# Patient Record
Sex: MALE | Race: BLACK OR AFRICAN AMERICAN
[De-identification: names, ages, dates, MRNs, and addresses within clinical notes are randomized per-mention and may not be internally consistent; named-entity substitution may affect disease eponyms.]

---

## 2020-06-29 ENCOUNTER — HOSPITAL ENCOUNTER (INPATIENT)
Dept: HOSPITAL 92 - ERS | Age: 40
LOS: 3 days | Discharge: HOME | DRG: 638 | End: 2020-07-02
Attending: INTERNAL MEDICINE | Admitting: INTERNAL MEDICINE
Payer: MEDICAID

## 2020-06-29 VITALS — BODY MASS INDEX: 31.8 KG/M2

## 2020-06-29 DIAGNOSIS — E66.9: ICD-10-CM

## 2020-06-29 DIAGNOSIS — E11.10: Primary | ICD-10-CM

## 2020-06-29 DIAGNOSIS — N17.9: ICD-10-CM

## 2020-06-29 DIAGNOSIS — Z87.891: ICD-10-CM

## 2020-06-29 DIAGNOSIS — Z88.0: ICD-10-CM

## 2020-06-29 DIAGNOSIS — Z79.4: ICD-10-CM

## 2020-06-29 LAB
ALBUMIN SERPL BCG-MCNC: 4.3 G/DL (ref 3.5–5)
ALP SERPL-CCNC: 130 U/L (ref 40–110)
ALT SERPL W P-5'-P-CCNC: 28 U/L (ref 8–55)
ANION GAP SERPL CALC-SCNC: (no result) MMOL/L (ref 10–20)
ANION GAP SERPL CALC-SCNC: (no result) MMOL/L (ref 10–20)
APAP SERPL-MCNC: (no result) MCG/ML (ref 10–30)
AST SERPL-CCNC: 13 U/L (ref 5–34)
BACTERIA UR QL AUTO: (no result) HPF
BASOPHILS # BLD AUTO: 0 THOU/UL (ref 0–0.2)
BASOPHILS NFR BLD AUTO: 0.3 % (ref 0–1)
BILIRUB SERPL-MCNC: 0.3 MG/DL (ref 0.2–1.2)
BUN SERPL-MCNC: 34 MG/DL (ref 8.9–20.6)
BUN SERPL-MCNC: 37 MG/DL (ref 8.9–20.6)
CALCIUM SERPL-MCNC: 8.7 MG/DL (ref 7.8–10.44)
CALCIUM SERPL-MCNC: 8.8 MG/DL (ref 7.8–10.44)
CHLORIDE SERPL-SCNC: 103 MMOL/L (ref 98–107)
CHLORIDE SERPL-SCNC: 93 MMOL/L (ref 98–107)
CO2 SERPL-SCNC: (no result) MMOL/L (ref 22–29)
CO2 SERPL-SCNC: (no result) MMOL/L (ref 22–29)
CREAT CL PREDICTED SERPL C-G-VRATE: 0 ML/MIN (ref 70–130)
CREAT CL PREDICTED SERPL C-G-VRATE: 0 ML/MIN (ref 70–130)
EOSINOPHIL # BLD AUTO: 0 THOU/UL (ref 0–0.7)
EOSINOPHIL NFR BLD AUTO: 0.2 % (ref 0–10)
GLOBULIN SER CALC-MCNC: 3.7 G/DL (ref 2.4–3.5)
GLUCOSE SERPL-MCNC: 608 MG/DL (ref 70–105)
GLUCOSE SERPL-MCNC: 871 MG/DL (ref 70–105)
HGB BLD-MCNC: 14.5 G/DL (ref 14–18)
LIPASE SERPL-CCNC: 14 U/L (ref 8–78)
LYMPHOCYTES # BLD: 1.5 THOU/UL (ref 1.2–3.4)
LYMPHOCYTES NFR BLD AUTO: 13.5 % (ref 21–51)
MCH RBC QN AUTO: 26.5 PG (ref 27–31)
MCV RBC AUTO: 88.5 FL (ref 78–98)
MONOCYTES # BLD AUTO: 0.8 THOU/UL (ref 0.11–0.59)
MONOCYTES NFR BLD AUTO: 7.1 % (ref 0–10)
NEUTROPHILS # BLD AUTO: 8.7 THOU/UL (ref 1.4–6.5)
NEUTROPHILS NFR BLD AUTO: 79 % (ref 42–75)
PLATELET # BLD AUTO: 240 THOU/UL (ref 130–400)
POTASSIUM SERPL-SCNC: 5.1 MMOL/L (ref 3.5–5.1)
POTASSIUM SERPL-SCNC: 5.6 MMOL/L (ref 3.5–5.1)
PROT UR STRIP.AUTO-MCNC: 20 MG/DL
RBC # BLD AUTO: 5.5 MILL/UL (ref 4.7–6.1)
RBC UR QL AUTO: (no result) HPF (ref 0–3)
SALICYLATES SERPL-MCNC: (no result) MG/DL (ref 15–30)
SODIUM SERPL-SCNC: 129 MMOL/L (ref 136–145)
SODIUM SERPL-SCNC: 136 MMOL/L (ref 136–145)
WBC # BLD AUTO: 11 THOU/UL (ref 4.8–10.8)
WBC UR QL AUTO: (no result) HPF (ref 0–3)

## 2020-06-29 PROCEDURE — 96366 THER/PROPH/DIAG IV INF ADDON: CPT

## 2020-06-29 PROCEDURE — 83690 ASSAY OF LIPASE: CPT

## 2020-06-29 PROCEDURE — 83880 ASSAY OF NATRIURETIC PEPTIDE: CPT

## 2020-06-29 PROCEDURE — 71045 X-RAY EXAM CHEST 1 VIEW: CPT

## 2020-06-29 PROCEDURE — 82330 ASSAY OF CALCIUM: CPT

## 2020-06-29 PROCEDURE — 85014 HEMATOCRIT: CPT

## 2020-06-29 PROCEDURE — 74177 CT ABD & PELVIS W/CONTRAST: CPT

## 2020-06-29 PROCEDURE — 83605 ASSAY OF LACTIC ACID: CPT

## 2020-06-29 PROCEDURE — 36416 COLLJ CAPILLARY BLOOD SPEC: CPT

## 2020-06-29 PROCEDURE — 84484 ASSAY OF TROPONIN QUANT: CPT

## 2020-06-29 PROCEDURE — 36415 COLL VENOUS BLD VENIPUNCTURE: CPT

## 2020-06-29 PROCEDURE — 82803 BLOOD GASES ANY COMBINATION: CPT

## 2020-06-29 PROCEDURE — 96365 THER/PROPH/DIAG IV INF INIT: CPT

## 2020-06-29 PROCEDURE — 81015 MICROSCOPIC EXAM OF URINE: CPT

## 2020-06-29 PROCEDURE — 85025 COMPLETE CBC W/AUTO DIFF WBC: CPT

## 2020-06-29 PROCEDURE — 80307 DRUG TEST PRSMV CHEM ANLYZR: CPT

## 2020-06-29 PROCEDURE — 80048 BASIC METABOLIC PNL TOTAL CA: CPT

## 2020-06-29 PROCEDURE — 96361 HYDRATE IV INFUSION ADD-ON: CPT

## 2020-06-29 PROCEDURE — 80053 COMPREHEN METABOLIC PANEL: CPT

## 2020-06-29 PROCEDURE — 87040 BLOOD CULTURE FOR BACTERIA: CPT

## 2020-06-29 PROCEDURE — 81003 URINALYSIS AUTO W/O SCOPE: CPT

## 2020-06-29 PROCEDURE — 96375 TX/PRO/DX INJ NEW DRUG ADDON: CPT

## 2020-06-29 PROCEDURE — 93005 ELECTROCARDIOGRAM TRACING: CPT

## 2020-06-29 NOTE — PDOC.HHP
Hospitalist HPI





- History of Present Illness


abdominal pain


History of Present Illness: 


Case of an 41y/o male with pmhx of DMt2 who comes to hospital due to abdominal 

pain. patient refers he was on his usual state of health until about a week ago 

when he noticed he started with polyuria and polydyspsia. symptoms kept 

progressing and 2 days ago patient started with abdominal pain nause and 

vomiting. symptoms continued to progressed until pain was unberable 10/10 in 

epigastric area, non radiating and patient decided to come to hospital. At the 

ED patient was diagnosed with DKA with blood glucouse over 800, elevated anion 

gap acidosis and positive ketones in urine for which hospitalist was consulted 

for further evaluation and management.


patient denies any fever chills dysuria diarrhe or cough, states has been 

compliant with medication despite not eating in the last 2 days due to abd 

discomfort. does refers this has happened in the past 





Hospitalist ROS





- Review of Systems


All other systems reviewed; all pertinent +/- noted in HPI/Subj





Hospitalist History





- Past Medical History


Source: patient


Endocrine: reports: Diabetes





- Past Surgical History


Past Surgical History: reports: no pertinent history





- Family History


Family History: reports: diabetes mellitus





- Social History


Smoking Status: Former smoker


Alcohol: reports: None


Drugs: reports: none


Activity level: independent ambulation





- Exam


General Appearance: NAD, awake alert


Eye: PERRL, anicteric sclera


ENT: normocephalic atraumatic, no oropharyngeal lesions, dry oral mucosa


Neck: supple, symmetric, no JVD, no thyromegaly


Heart: no murmur, no gallops, no rubs, normal peripheral pulses


Heart - other findings: tachycardia


Respiratory: CTAB, no wheezes, no rales


Gastrointestinal: soft, non-distended, normal bowel sounds, no palpable masses


Extremities: no cyanosis, no clubbing, no edema


Skin: normal turgor, no lesions, no rashes


Neurological: cranial nerve grossly intact, normal sensation to touch, no 

weakness


Musculoskeletal: normal tone, normal strength, no muscle wasting


Psychiatric: normal affect, normal behavior, A&O x 3





Hospitalist Results





- Labs


Result Diagrams: 


 06/29/20 19:12





 06/29/20 19:12


Lab results: 


 











WBC  11.0 thou/uL (4.8-10.8)  H  06/29/20  19:12    


 


Hgb  14.5 g/dL (14.0-18.0)   06/29/20  19:12    


 


Hct  48.6 % (42.0-52.0)   06/29/20  19:12    


 


MCV  88.5 fL (78.0-98.0)   06/29/20  19:12    


 


Plt Count  240 thou/uL (130-400)   06/29/20  19:12    


 


Neutrophils %  79.0 % (42.0-75.0)  H  06/29/20  19:12    


 


Sodium  129 mmol/L (136-145)  L  06/29/20  19:12    


 


Potassium  5.6 mmol/L (3.5-5.1)  H  06/29/20  19:12    


 


Chloride  93 mmol/L ()  L  06/29/20  19:12    


 


Carbon Dioxide  Less than  8 mmol/L (22-29)  L*  06/29/20  19:12    


 


BUN  37 mg/dL (8.9-20.6)  H  06/29/20  19:12    


 


Creatinine  2.62 mg/dL (0.7-1.3)  H  06/29/20  19:12    


 


Glucose  871 mg/dL ()  H*  06/29/20  19:12    


 


Calcium  8.8 mg/dL (7.8-10.44)   06/29/20  19:12    


 


Total Bilirubin  0.3 mg/dL (0.2-1.2)   06/29/20  19:12    


 


AST  13 U/L (5-34)   06/29/20  19:12    


 


ALT  28 U/L (8-55)   06/29/20  19:12    


 


Alkaline Phosphatase  130 U/L ()  H  06/29/20  19:12    


 


Troponin I  Less than  0.010 ng/mL (< 0.028)   06/29/20  19:12    


 


B-Natriuretic Peptide  Less than  10.0 pg/mL (0-100)   06/29/20  19:12    


 


Serum Total Protein  8.0 g/dL (6.0-8.3)   06/29/20  19:12    


 


Albumin  4.3 g/dL (3.5-5.0)   06/29/20  19:12    


 


Lipase  14 U/L (8-78)   06/29/20  19:12    


 


Urine Ketones  100 mg/dL (Negative)  A  06/29/20  20:56    


 


Urine Blood  1+  (Negative)  A  06/29/20  20:56    


 


Urine Nitrite  Negative  (Negative)   06/29/20  20:56    


 


Ur Leukocyte Esterase  Negative Delio/uL (Negative)   06/29/20  20:56    


 


Urine RBC  0-3 HPF (0-3)   06/29/20  20:56    


 


Urine WBC  0-3 HPF (0-3)   06/29/20  20:56    


 


Ur Squamous Epith Cells  0-3 HPF (0-3)   06/29/20  20:56    


 


Urine Bacteria  Rare-Few HPF (None Seen)   06/29/20  20:56    














- Radiology Interpretation


  ** CT scan - abdomen


Additional Comment: 





distended bladder





  ** Chest x-ray


Additional Comment: 





no consolidation effusions or infiltrates





Hospitalist H&P A/P





- Problem


(1) DKA (diabetic ketoacidoses)


Code(s): E11.10 - TYPE 2 DIABETES MELLITUS WITH KETOACIDOSIS WITHOUT COMA   

Status: Acute   





(2) Metabolic acidosis due to diabetes mellitus


Code(s): E11.69 - TYPE 2 DIABETES MELLITUS WITH OTHER SPECIFIED COMPLICATION; 

E87.2 - ACIDOSIS   Status: Acute   





(3) Nausea & vomiting


Code(s): R11.2 - NAUSEA WITH VOMITING, UNSPECIFIED   Status: Acute   





(4) Intractable abdominal pain


Code(s): R10.9 - UNSPECIFIED ABDOMINAL PAIN   Status: Acute   





(5) Obese


Code(s): E66.9 - OBESITY, UNSPECIFIED   Status: Acute   





- Plan


Plan: 


39 y/o male type 2 DM who presents with DKA





- insulin drip


-aggressive  hydration


- bmp q 4 hrs, monitor K and anion gap


- accu check q 1 hr


- npo


-symptomatic tx for n/v


- pain management


- dvt prophylaxis

## 2020-06-29 NOTE — RAD
XR Chest 1 View Portable



History: Chest pain



Comparison: None



Findings: Lungs are clear. No pneumothorax or effusion. Cardiac silhouette and mediastinal contours a
re within normal limits. No acute osseous abnormality.



Impression: No acute intrathoracic abnormality.



Reported By: Jeramie Marcial 

Electronically Signed:  6/29/2020 7:44 PM

## 2020-06-29 NOTE — CT
CT Abdomen Pelvis W Con



History: Abdominal pain



Comparison: None.



Findings: Exam is limited due to motion artifact. The lung bases are clear. No pericardial effusion.



Marked distention of the urinary bladder. Mild bilateral hydroureter and dilated renal pelvis by with
out distal obstructing stone.



The liver, spleen, pancreas, adrenal glands are unremarkable. The aortoiliac contour is nonaneurysmal
. The appendix is visualized and is normal.



Few mildly enlarged ileocolic mesenteric lymph nodes.



No acute osseous abnormality.



Impression: 

1. Marked distention of the urinary bladder with subsequent mild dilatation of both renal pelvis and 
ureters due to increased back pressure. Evaluation for neurogenic bladder recommended. Posada

catheterization suggested.

2. No other acute inflammatory process within the abdomen or pelvis. Normal appendix.



Reported By: Jeramie Marcial 

Electronically Signed:  6/29/2020 8:50 PM

## 2020-06-30 LAB
ANION GAP SERPL CALC-SCNC: 10 MMOL/L (ref 10–20)
ANION GAP SERPL CALC-SCNC: 13 MMOL/L (ref 10–20)
ANION GAP SERPL CALC-SCNC: 20 MMOL/L (ref 10–20)
ANION GAP SERPL CALC-SCNC: 23 MMOL/L (ref 10–20)
ANION GAP SERPL CALC-SCNC: 25 MMOL/L (ref 10–20)
BICARBONATE (HCO3V): 6.5 MMOL/L (ref 22–28)
BUN SERPL-MCNC: 15 MG/DL (ref 8.9–20.6)
BUN SERPL-MCNC: 18 MG/DL (ref 8.9–20.6)
BUN SERPL-MCNC: 25 MG/DL (ref 8.9–20.6)
BUN SERPL-MCNC: 27 MG/DL (ref 8.9–20.6)
BUN SERPL-MCNC: 29 MG/DL (ref 8.9–20.6)
CA-I BLD-SCNC: 1.04 MMOL/L
CALCIUM SERPL-MCNC: 7.7 MG/DL (ref 7.8–10.44)
CALCIUM SERPL-MCNC: 7.9 MG/DL (ref 7.8–10.44)
CALCIUM SERPL-MCNC: 8 MG/DL (ref 7.8–10.44)
CALCIUM SERPL-MCNC: 8.1 MG/DL (ref 7.8–10.44)
CALCIUM SERPL-MCNC: 8.1 MG/DL (ref 7.8–10.44)
CHLORIDE SERPL-SCNC: 101 MMOL/L (ref 98–107)
CHLORIDE SERPL-SCNC: 106 MMOL/L (ref 98–107)
CHLORIDE SERPL-SCNC: 106 MMOL/L (ref 98–107)
CHLORIDE SERPL-SCNC: 107 MMOL/L (ref 98–107)
CHLORIDE SERPL-SCNC: 108 MMOL/L (ref 98–107)
CHLORIDE SERPL-SCNC: 109 MMOL/L (ref 98–107)
CO2 BLDV CALC-SCNC: 7.3 MMOL/L (ref 22–28)
CO2 SERPL-SCNC: 10 MMOL/L (ref 22–29)
CO2 SERPL-SCNC: 12 MMOL/L (ref 22–29)
CO2 SERPL-SCNC: 14 MMOL/L (ref 22–29)
CO2 SERPL-SCNC: 18 MMOL/L (ref 22–29)
CO2 SERPL-SCNC: 21 MMOL/L (ref 22–29)
CO2 TENSION (PVCO2): 24.3 MMHG (ref 40–50)
CREAT CL PREDICTED SERPL C-G-VRATE: 101 ML/MIN (ref 70–130)
CREAT CL PREDICTED SERPL C-G-VRATE: 73 ML/MIN (ref 70–130)
CREAT CL PREDICTED SERPL C-G-VRATE: 80 ML/MIN (ref 70–130)
CREAT CL PREDICTED SERPL C-G-VRATE: 84 ML/MIN (ref 70–130)
CREAT CL PREDICTED SERPL C-G-VRATE: 97 ML/MIN (ref 70–130)
GLUCOSE SERPL-MCNC: (no result) MG/DL (ref 70–105)
GLUCOSE SERPL-MCNC: 171 MG/DL (ref 70–105)
GLUCOSE SERPL-MCNC: 182 MG/DL (ref 70–105)
GLUCOSE SERPL-MCNC: 266 MG/DL (ref 70–105)
GLUCOSE SERPL-MCNC: 289 MG/DL (ref 70–105)
GLUCOSE SERPL-MCNC: 335 MG/DL (ref 70–105)
HCT VFR BLD CALC: 51 % (ref 42–52)
HEMOGLOBIN - CALC: 17.4 G/DL (ref 14–18)
POTASSIUM SERPL-SCNC: 3.8 MMOL/L (ref 3.5–5.1)
POTASSIUM SERPL-SCNC: 4.2 MMOL/L (ref 3.5–5.1)
POTASSIUM SERPL-SCNC: 4.4 MMOL/L (ref 3.5–5.1)
POTASSIUM SERPL-SCNC: 4.6 MMOL/L (ref 3.5–5.1)
POTASSIUM SERPL-SCNC: 4.9 MMOL/L (ref 3.5–5.1)
POTASSIUM SERPL-SCNC: 5.4 MMOL/L (ref 3.5–5.1)
SAO2 % BLDV FROM PO2: 74.7 % (ref 60–85)
SODIUM SERPL-SCNC: 126 MMOL/L (ref 138–145)
SODIUM SERPL-SCNC: 135 MMOL/L (ref 136–145)
SODIUM SERPL-SCNC: 136 MMOL/L (ref 136–145)
SODIUM SERPL-SCNC: 137 MMOL/L (ref 136–145)

## 2020-06-30 RX ADMIN — INSULIN LISPRO PRN UNIT: 100 INJECTION, SOLUTION INTRAVENOUS; SUBCUTANEOUS at 20:08

## 2020-06-30 RX ADMIN — Medication SCH ML: at 20:08

## 2020-06-30 RX ADMIN — Medication SCH: at 08:24

## 2020-06-30 RX ADMIN — ONDANSETRON PRN MG: 2 INJECTION INTRAMUSCULAR; INTRAVENOUS at 23:22

## 2020-06-30 RX ADMIN — ONDANSETRON PRN MG: 2 INJECTION INTRAMUSCULAR; INTRAVENOUS at 01:06

## 2020-06-30 NOTE — PDOC.HOSPP
- Subjective


Encounter Date: 06/30/20


Subjective: 





Patient says he is feeling better this morning.  Denies any specific 

complaints.  Patient actually only responds by nodding his head until asked to 

speak eventually.  He then only ask if he is going to be able to get food.  Has 

no further abdominal pain no nausea.





- Objective


Vital Signs & Weight: 


 Vital Signs (12 hours)











  Temp Pulse Ox


 


 06/30/20 11:26  99.0 F 


 


 06/30/20 08:00   100


 


 06/30/20 07:17  98.6 F 


 


 06/30/20 03:42  98.7 F 


 


 06/30/20 00:28  98.8 F 








 Weight











Weight                         203 lb 9.6 oz











 Most Recent Monitor Data











Heart Rate from ECG            99


 


NIBP                           102/47


 


NIBP BP-Mean                   65


 


Respiration from ECG           13


 


SpO2                           100














I&O: 


 











 06/29/20 06/30/20 07/01/20





 06:59 06:59 06:59


 


Intake Total  1536 776.0


 


Output Total  950 


 


Balance  586 776.0











Result Diagrams: 


 06/29/20 19:12





 06/30/20 05:34


Additional Labs: 


 Accuchecks











  06/30/20 06/30/20 06/30/20





  10:39 09:41 08:43


 


POC Glucose  239 H  261 H  240 H














  06/30/20 06/30/20 06/30/20





  07:47 06:25 05:09


 


POC Glucose  257 H  284 H  276 H














  06/30/20 06/30/20 06/30/20





  04:10 03:26 02:16


 


POC Glucose  217 H  239 H  278 H














  06/30/20 06/29/20 06/29/20





  01:32 22:56 22:23


 


POC Glucose  322 H  447 H  459 H














  06/29/20





  21:07


 


POC Glucose  Greater than  550 H*














Hospitalist ROS





- Medication


Medications: 


Active Medications











Generic Name Dose Route Start Last Admin





  Trade Name Freq  PRN Reason Stop Dose Admin


 


Enoxaparin Sodium  40 mg  06/30/20 09:00  06/30/20 08:22





  Lovenox  SC   40 mg





  0900 BRUNA   Administration





     





     





     





     


 


Potassium Chloride/Dextrose/Sod Cl  1,000 mls @ 250 mls/hr  06/29/20 21:24  06/ 30/20 04:19





  D5 1/2 Ns W/20 Meq Kcl  IV   1,000 mls





  .Q4H PRN   Administration





  Step 4 of DKA Protocol   





     





  Protocol   





     


 


Ondansetron HCl  4 mg  06/29/20 21:24  06/30/20 01:06





  Zofran  IVP   4 mg





  Q6H PRN   Administration





  Nausea/Vomiting   





     





     





     


 


Sodium Chloride  10 ml  06/30/20 09:00  06/30/20 08:24





  Flush - Normal Saline  IVF   Not Given





  Q12HR BRUNA   





     





     





     





     














- Exam


General Appearance: NAD, awake alert


Heart: RRR, no murmur, no gallops, no rubs


Heart - other findings: Mild tachycardia


Respiratory: CTAB, no wheezes, no rales, no ronchi, normal chest expansion, no 

tachypnea, normal percussion


Gastrointestinal: soft, non-tender, non-distended, normal bowel sounds, no 

palpable masses, no hepatomegaly, no splenomegaly, no bruit


Extremities: no cyanosis, no clubbing, no edema


Skin: normal turgor, no lesions, no rashes


Neurological: no focal deficits


Musculoskeletal: normal tone, normal strength, no muscle wasting


Psychiatric: normal affect, normal behavior, A&O x 3





Hosp A/P


(1) DKA (diabetic ketoacidoses)


Code(s): E11.10 - TYPE 2 DIABETES MELLITUS WITH KETOACIDOSIS WITHOUT COMA   

Status: Acute   





(2) Diabetes mellitus


Code(s): E11.9 - TYPE 2 DIABETES MELLITUS WITHOUT COMPLICATIONS   Status: Acute

   





(3) Abdominal pain


Code(s): R10.9 - UNSPECIFIED ABDOMINAL PAIN   Status: Resolved   





(4) Nausea & vomiting


Code(s): R11.2 - NAUSEA WITH VOMITING, UNSPECIFIED   Status: Resolved   





(5) Obese


Code(s): E66.9 - OBESITY, UNSPECIFIED   Status: Acute   





- Plan





Patient appears to be improving.  Continue with the DKA protocol.  We will 

repeat labs now.  His last check his anion gap was still at 23.  Clinically 

looks much better.  We will go ahead and let him eat a diabetic diet.  We will 

continue to monitor until he resolves his anion gap and then will start him on 

some long-acting insulin.

## 2020-07-01 LAB
ANION GAP SERPL CALC-SCNC: 17 MMOL/L (ref 10–20)
BUN SERPL-MCNC: 16 MG/DL (ref 8.9–20.6)
CALCIUM SERPL-MCNC: 7.9 MG/DL (ref 7.8–10.44)
CHLORIDE SERPL-SCNC: 104 MMOL/L (ref 98–107)
CO2 SERPL-SCNC: 18 MMOL/L (ref 22–29)
CREAT CL PREDICTED SERPL C-G-VRATE: 94 ML/MIN (ref 70–130)
GLUCOSE SERPL-MCNC: 268 MG/DL (ref 70–105)
HGB BLD-MCNC: 12.8 G/DL (ref 14–18)
MCH RBC QN AUTO: 26.1 PG (ref 27–31)
MCV RBC AUTO: 80.6 FL (ref 78–98)
MDIFF COMPLETE?: YES
PLATELET # BLD AUTO: 163 THOU/UL (ref 130–400)
POTASSIUM SERPL-SCNC: 4 MMOL/L (ref 3.5–5.1)
RBC # BLD AUTO: 4.91 MILL/UL (ref 4.7–6.1)
SODIUM SERPL-SCNC: 135 MMOL/L (ref 136–145)
WBC # BLD AUTO: 3.6 THOU/UL (ref 4.8–10.8)

## 2020-07-01 RX ADMIN — Medication SCH ML: at 20:08

## 2020-07-01 RX ADMIN — INSULIN LISPRO PRN UNIT: 100 INJECTION, SOLUTION INTRAVENOUS; SUBCUTANEOUS at 06:12

## 2020-07-01 RX ADMIN — INSULIN GLARGINE SCH MLS: 100 INJECTION, SOLUTION SUBCUTANEOUS at 09:28

## 2020-07-01 RX ADMIN — Medication SCH ML: at 10:09

## 2020-07-01 RX ADMIN — INSULIN LISPRO PRN UNIT: 100 INJECTION, SOLUTION INTRAVENOUS; SUBCUTANEOUS at 13:03

## 2020-07-01 NOTE — PDOC.HOSPP
- Subjective


Encounter Date: 07/01/20


Subjective: 





Generally doing better.  He reported this morning that he was having some 

abdominal pain prior to my arrival.  On my arrival the patient denied any 

abdominal pain.  He did report some anorexia and was not much interested in his 

breakfast.  He has not been up and around much either.





- Objective


Vital Signs & Weight: 


 Vital Signs (12 hours)











  Temp Pulse Resp BP Pulse Ox


 


 07/01/20 16:30  98.6 F  72  18  142/75 H  99


 


 07/01/20 15:24  98.8 F    


 


 07/01/20 11:34  98.8 F    


 


 07/01/20 08:00      100


 


 07/01/20 07:11  99.0 F    








 Weight











Weight                         203 lb 9.6 oz











 Most Recent Monitor Data











Heart Rate from ECG            81


 


NIBP                           98/71


 


NIBP BP-Mean                   80


 


Respiration from ECG           18


 


SpO2                           100














I&O: 


 











 06/30/20 07/01/20 07/02/20





 06:59 06:59 06:59


 


Intake Total 1536 2840.5 376


 


Output Total 950 2350 1870


 


Balance 586 490.5 -1494











Result Diagrams: 


 07/01/20 07:13





 07/01/20 07:13


Additional Labs: 


 Accuchecks











  07/01/20 07/01/20 06/30/20





  11:01 06:06 20:08


 


POC Glucose  272 H  288 H  243 H














Hospitalist ROS





- Medication


Medications: 


Active Medications











Generic Name Dose Route Start Last Admin





  Trade Name Freq  PRN Reason Stop Dose Admin


 


Enoxaparin Sodium  40 mg  06/30/20 09:00  07/01/20 09:03





  Lovenox  SC   40 mg





  0900 BRUNA   Administration





     





     





     





     


 


Insulin Glargine 20 units/  0.2 mls @ 0 mls/hr  07/01/20 09:00  07/01/20 09:28





  Miscellaneous Medication  SC   0.2 mls





  DAILY BRUNA   Administration





     





     





     





  As Directed   


 


Insulin Human Lispro  0 units  06/30/20 17:07  07/01/20 13:03





  Humalog  SC   6 unit





  .MODERATE SLIDING SC PRN   Administration





  MODERATE SLIDING SCALE   





     





  Protocol   





     


 


Ondansetron HCl  4 mg  06/29/20 21:24  06/30/20 23:22





  Zofran  IVP   4 mg





  Q6H PRN   Administration





  Nausea/Vomiting   





     





     





     


 


Sodium Chloride  10 ml  06/30/20 09:00  07/01/20 10:09





  Flush - Normal Saline  IVF   10 ml





  Q12HR BRUNA   Administration





     





     





     





     














- Exam


General Appearance: NAD, awake alert


Heart: RRR, no murmur, no gallops, no rubs, normal peripheral pulses


Respiratory: CTAB, no wheezes, no rales, no ronchi, normal chest expansion, no 

tachypnea, normal percussion


Gastrointestinal: soft, non-tender, non-distended, normal bowel sounds, no 

palpable masses, no hepatomegaly, no splenomegaly, no bruit


Extremities: no cyanosis, no clubbing, no edema


Skin: normal turgor, no lesions, no rashes


Musculoskeletal: normal tone


Psychiatric: normal affect, normal behavior, A&O x 3


Psychiatric - other findings: Stoic and reluctant to speak.





Hosp A/P


(1) DKA (diabetic ketoacidoses)


Code(s): E11.10 - TYPE 2 DIABETES MELLITUS WITH KETOACIDOSIS WITHOUT COMA   

Status: Acute   





(2) Diabetes mellitus


Code(s): E11.9 - TYPE 2 DIABETES MELLITUS WITHOUT COMPLICATIONS   Status: Acute

   





(3) Abdominal pain


Code(s): R10.9 - UNSPECIFIED ABDOMINAL PAIN   Status: Resolved   





(4) Nausea & vomiting


Code(s): R11.2 - NAUSEA WITH VOMITING, UNSPECIFIED   Status: Resolved   





(5) Obese


Code(s): E66.9 - OBESITY, UNSPECIFIED   Status: Acute   





(6) Acute kidney injury


Code(s): N17.9 - ACUTE KIDNEY FAILURE, UNSPECIFIED   Status: Acute   





- Plan





Patient appears to be improving.  His anion gap is resolved.  He is off the 

insulin drip.  I have resumed his usual home dose of Lantus 20 units in the 

morning.  Continue with moderate sliding scale.  Throughout the day his blood 

sugars appear to have continue to run high.  Can add some additional Lantus for 

the night.  We will recheck labs in the morning.  Encouraged increased 

ambulation.  His renal indices are improving.  His GFR is back up to 70.  

Unclear as to what his baseline is.

## 2020-07-01 NOTE — RAD
XR Chest 1 View Portable



History: Altered mental status



Comparison: Radiograph 2 days prior



Findings: Lungs are clear. No pneumothorax or effusion. Cardiac silhouette and mediastinal contours a
re within normal limits.



Impression: No acute intrathoracic abnormality.



Reported By: Jeramie Marcial 

Electronically Signed:  7/1/2020 9:53 PM

## 2020-07-02 VITALS — TEMPERATURE: 98.8 F | SYSTOLIC BLOOD PRESSURE: 128 MMHG | DIASTOLIC BLOOD PRESSURE: 80 MMHG

## 2020-07-02 LAB
ANION GAP SERPL CALC-SCNC: 14 MMOL/L (ref 10–20)
BUN SERPL-MCNC: 12 MG/DL (ref 8.9–20.6)
CALCIUM SERPL-MCNC: 7.9 MG/DL (ref 7.8–10.44)
CHLORIDE SERPL-SCNC: 101 MMOL/L (ref 98–107)
CO2 SERPL-SCNC: 23 MMOL/L (ref 22–29)
CREAT CL PREDICTED SERPL C-G-VRATE: 118 ML/MIN (ref 70–130)
GLUCOSE SERPL-MCNC: 240 MG/DL (ref 70–105)
POTASSIUM SERPL-SCNC: 3.6 MMOL/L (ref 3.5–5.1)
SODIUM SERPL-SCNC: 134 MMOL/L (ref 136–145)

## 2020-07-02 RX ADMIN — Medication SCH ML: at 09:21

## 2020-07-02 RX ADMIN — INSULIN GLARGINE SCH MLS: 100 INJECTION, SOLUTION SUBCUTANEOUS at 09:20

## 2020-07-02 RX ADMIN — INSULIN LISPRO PRN UNIT: 100 INJECTION, SOLUTION INTRAVENOUS; SUBCUTANEOUS at 06:13

## 2020-07-02 NOTE — DIS
DATE OF ADMISSION:  06/29/2020



DATE OF DISCHARGE:  07/02/2020



DISCHARGE DIAGNOSES:  

1. Diabetic ketoacidosis.

2. Diabetes mellitus.

3. Abdominal pain.

4. Nausea, vomiting.

5. Obesity.

6. Acute kidney injury.



HISTORY OF PRESENT ILLNESS:  This patient is a 40-year-old male, has a history of

diabetes.  Patient recently moved to the area and does not have a primary care

provider.  He also did not have a glucometer to be able to monitor his blood sugars

and was not really quite sure what his blood sugar range should be.  The patient

presented to the emergency department with abdominal pain, nausea, vomiting, and was

found to be in diabetic ketoacidosis. 



HOSPITAL COURSE:  The patient was admitted to the hospital.  He had a CT of the

abdomen and pelvis, which showed some bladder distention with subtle associated

hydronephrosis.  He subsequently was able to void well on his own.  However, he was

started on the DKA protocol.  He did have some evidence of acute kidney injury with

a GFR of 33 with hydration.  His renal function improved and his GFR ultimately came

up to 90 at the time of discharge.  His anion gap normalized with insulin drip and

fluids.  He was able to come off the drip, convert over to long-acting and

short-acting insulin.  He felt well.  His labs remained normal other than blood

sugar still ranging in the 200s.  He had some adjustments made with that, but he was

tolerating a regular diet.  All abdominal pain had resolved and he was felt stable

for discharge. 



DISCHARGE EXAMINATION:  VITAL SIGNS:  On the day of discharge, temperature is 98.8,

pulse 74, respirations 18, O2 saturation 99% on room air, and blood pressure 128/80. 

GENERAL:  He is awake and alert. 

HEART:  Regular rate and rhythm. 

LUNGS:  Clear bilaterally. 

ABDOMEN:  Benign. 

EXTREMITIES:  No edema.



DISPOSITION:  The patient is discharged to home.  He is to increase his Lantus

regimen to Levemir 30 units in the morning, 10 units at night and continue with 15

units of NovoLog with his meals.  He is given a prescription for a new glucometer

and he is strongly encouraged to establish with a new primary care provider.  He was

given a list of potential options for local providers.  His activity is as

tolerated.  He will be on a diabetic diet.  He can return to the hospital at anytime

should he have the need to do so. 



TIME SPENT:  Total time in discharge activities was greater than 35 minutes.







Job ID:  [id number]

## 2020-07-19 ENCOUNTER — HOSPITAL ENCOUNTER (EMERGENCY)
Dept: HOSPITAL 92 - ERS | Age: 40
Discharge: HOME | End: 2020-07-19
Payer: SELF-PAY

## 2020-07-19 DIAGNOSIS — E11.9: ICD-10-CM

## 2020-07-19 DIAGNOSIS — R13.10: ICD-10-CM

## 2020-07-19 DIAGNOSIS — Z79.4: ICD-10-CM

## 2020-07-19 DIAGNOSIS — R07.89: Primary | ICD-10-CM

## 2020-07-19 LAB
ALBUMIN SERPL BCG-MCNC: 3.7 G/DL (ref 3.5–5)
ALP SERPL-CCNC: 124 U/L (ref 40–110)
ALT SERPL W P-5'-P-CCNC: 56 U/L (ref 8–55)
ANION GAP SERPL CALC-SCNC: 11 MMOL/L (ref 10–20)
AST SERPL-CCNC: 29 U/L (ref 5–34)
BILIRUB SERPL-MCNC: 0.3 MG/DL (ref 0.2–1.2)
BUN SERPL-MCNC: 8 MG/DL (ref 8.9–20.6)
CALCIUM SERPL-MCNC: 8.5 MG/DL (ref 7.8–10.44)
CHLORIDE SERPL-SCNC: 102 MMOL/L (ref 98–107)
CO2 SERPL-SCNC: 28 MMOL/L (ref 22–29)
CREAT CL PREDICTED SERPL C-G-VRATE: 0 ML/MIN (ref 70–130)
GLOBULIN SER CALC-MCNC: 3.5 G/DL (ref 2.4–3.5)
GLUCOSE SERPL-MCNC: 426 MG/DL (ref 70–105)
HGB BLD-MCNC: 12.2 G/DL (ref 14–18)
LIPASE SERPL-CCNC: 19 U/L (ref 8–78)
MCH RBC QN AUTO: 26.3 PG (ref 27–31)
MCV RBC AUTO: 80.3 FL (ref 78–98)
MDIFF COMPLETE?: YES
PLATELET # BLD AUTO: 156 THOU/UL (ref 130–400)
POLYCHROMASIA BLD QL SMEAR: (no result) (100X)
POTASSIUM SERPL-SCNC: 4 MMOL/L (ref 3.5–5.1)
RBC # BLD AUTO: 4.65 MILL/UL (ref 4.7–6.1)
SODIUM SERPL-SCNC: 137 MMOL/L (ref 136–145)
WBC # BLD AUTO: 3.3 THOU/UL (ref 4.8–10.8)

## 2020-07-19 PROCEDURE — 85025 COMPLETE CBC W/AUTO DIFF WBC: CPT

## 2020-07-19 PROCEDURE — 96360 HYDRATION IV INFUSION INIT: CPT

## 2020-07-19 PROCEDURE — 71275 CT ANGIOGRAPHY CHEST: CPT

## 2020-07-19 PROCEDURE — 71045 X-RAY EXAM CHEST 1 VIEW: CPT

## 2020-07-19 PROCEDURE — 36416 COLLJ CAPILLARY BLOOD SPEC: CPT

## 2020-07-19 PROCEDURE — 93005 ELECTROCARDIOGRAM TRACING: CPT

## 2020-07-19 PROCEDURE — 85379 FIBRIN DEGRADATION QUANT: CPT

## 2020-07-19 PROCEDURE — 94760 N-INVAS EAR/PLS OXIMETRY 1: CPT

## 2020-07-19 PROCEDURE — 80053 COMPREHEN METABOLIC PANEL: CPT

## 2020-07-19 PROCEDURE — 84484 ASSAY OF TROPONIN QUANT: CPT

## 2020-07-19 PROCEDURE — 83690 ASSAY OF LIPASE: CPT

## 2020-07-19 NOTE — RAD
SINGLE VIEW OF THE CHEST:

 

Comparison: 7-1-2020

 

History: Abdominal pain and esophagus when swallowing food. Chest pain. 

 

FINDINGS:

Single view of the chest shows a normal sized cardiomediastinal silhouette. There is no evidence of c
onsolidation, mass, or pleural effusion. The bones are unremarkable.

 

IMPRESSION:

No evidence of acute cardiopulmonary disease.

 

POS: [facility]

## 2020-07-19 NOTE — CT
CT arteriogram chest with IV contrast and 3-D imaging



HISTORY: Chest pain.



FINDINGS: There is good contrast opacification of the pulmonary arteries and thoracic aorta with norm
al branching great vessels at the aortic arch. Lungs are well-inflated with scattered minimal areas

of groundglass parenchymal opacity. No pleural fluid or pneumothorax. No lobar consolidation.



No mediastinal adenopathy evident. There is incomplete posterior fusion of several lower thoracic and
 upper vertebrae.



  



IMPRESSION :

No evidence of pulmonary embolus or other acute abnormality.



Reported By: NIKOLE Chavez 

Electronically Signed:  7/19/2020 9:05 PM

## 2020-10-29 ENCOUNTER — HOSPITAL ENCOUNTER (EMERGENCY)
Dept: HOSPITAL 92 - ERS | Age: 40
Discharge: HOME | End: 2020-10-29
Payer: MEDICAID

## 2020-10-29 DIAGNOSIS — Z79.899: ICD-10-CM

## 2020-10-29 DIAGNOSIS — E11.65: Primary | ICD-10-CM

## 2020-10-29 DIAGNOSIS — F32.9: ICD-10-CM

## 2020-10-29 DIAGNOSIS — Z79.4: ICD-10-CM

## 2020-10-29 LAB
ALBUMIN SERPL BCG-MCNC: 3.5 G/DL (ref 3.5–5)
ALP SERPL-CCNC: 92 U/L (ref 40–110)
ALT SERPL W P-5'-P-CCNC: 21 U/L (ref 8–55)
ANION GAP SERPL CALC-SCNC: 11 MMOL/L (ref 10–20)
AST SERPL-CCNC: 29 U/L (ref 5–34)
BASOPHILS # BLD AUTO: 0 THOU/UL (ref 0–0.2)
BASOPHILS NFR BLD AUTO: 1 % (ref 0–1)
BILIRUB SERPL-MCNC: 0.3 MG/DL (ref 0.2–1.2)
BUN SERPL-MCNC: 14 MG/DL (ref 8.9–20.6)
CALCIUM SERPL-MCNC: 8.6 MG/DL (ref 7.8–10.44)
CHLORIDE SERPL-SCNC: 103 MMOL/L (ref 98–107)
CK SERPL-CCNC: 155 U/L (ref 30–200)
CO2 SERPL-SCNC: 26 MMOL/L (ref 22–29)
CREAT CL PREDICTED SERPL C-G-VRATE: 0 ML/MIN (ref 70–130)
EOSINOPHIL # BLD AUTO: 0 THOU/UL (ref 0–0.7)
EOSINOPHIL NFR BLD AUTO: 0.9 % (ref 0–10)
GLOBULIN SER CALC-MCNC: 4.8 G/DL (ref 2.4–3.5)
GLUCOSE SERPL-MCNC: 348 MG/DL (ref 70–105)
HGB BLD-MCNC: 12.5 G/DL (ref 14–18)
LEUKOCYTE ESTERASE UR QL STRIP.AUTO: 250 LEU/UL
LYMPHOCYTES # BLD: 1.9 THOU/UL (ref 1.2–3.4)
LYMPHOCYTES NFR BLD AUTO: 46.9 % (ref 21–51)
MCH RBC QN AUTO: 26 PG (ref 27–31)
MCV RBC AUTO: 79.2 FL (ref 78–98)
MONOCYTES # BLD AUTO: 0.5 THOU/UL (ref 0.11–0.59)
MONOCYTES NFR BLD AUTO: 13 % (ref 0–10)
NEUTROPHILS # BLD AUTO: 1.5 THOU/UL (ref 1.4–6.5)
NEUTROPHILS NFR BLD AUTO: 38.2 % (ref 42–75)
PLATELET # BLD AUTO: 213 THOU/UL (ref 130–400)
POTASSIUM SERPL-SCNC: 4.3 MMOL/L (ref 3.5–5.1)
RBC # BLD AUTO: 4.8 MILL/UL (ref 4.7–6.1)
RBC UR QL AUTO: (no result) HPF (ref 0–3)
SODIUM SERPL-SCNC: 136 MMOL/L (ref 136–145)
SP GR UR STRIP: 1.03 (ref 1–1.04)
WBC # BLD AUTO: 4 THOU/UL (ref 4.8–10.8)
WBC UR QL AUTO: (no result) HPF (ref 0–3)

## 2020-10-29 PROCEDURE — 82550 ASSAY OF CK (CPK): CPT

## 2020-10-29 PROCEDURE — 84484 ASSAY OF TROPONIN QUANT: CPT

## 2020-10-29 PROCEDURE — 80053 COMPREHEN METABOLIC PANEL: CPT

## 2020-10-29 PROCEDURE — 82010 KETONE BODYS QUAN: CPT

## 2020-10-29 PROCEDURE — 81003 URINALYSIS AUTO W/O SCOPE: CPT

## 2020-10-29 PROCEDURE — 94760 N-INVAS EAR/PLS OXIMETRY 1: CPT

## 2020-10-29 PROCEDURE — 81015 MICROSCOPIC EXAM OF URINE: CPT

## 2020-10-29 PROCEDURE — 93005 ELECTROCARDIOGRAM TRACING: CPT

## 2020-10-29 PROCEDURE — 85025 COMPLETE CBC W/AUTO DIFF WBC: CPT

## 2020-11-01 ENCOUNTER — HOSPITAL ENCOUNTER (OUTPATIENT)
Dept: HOSPITAL 92 - ERS | Age: 40
Setting detail: OBSERVATION
LOS: 2 days | Discharge: HOME | End: 2020-11-03
Attending: FAMILY MEDICINE | Admitting: FAMILY MEDICINE
Payer: MEDICAID

## 2020-11-01 VITALS — BODY MASS INDEX: 33.8 KG/M2

## 2020-11-01 DIAGNOSIS — Z79.899: ICD-10-CM

## 2020-11-01 DIAGNOSIS — Z20.828: ICD-10-CM

## 2020-11-01 DIAGNOSIS — I10: ICD-10-CM

## 2020-11-01 DIAGNOSIS — D64.9: ICD-10-CM

## 2020-11-01 DIAGNOSIS — Z79.4: ICD-10-CM

## 2020-11-01 DIAGNOSIS — Z88.0: ICD-10-CM

## 2020-11-01 DIAGNOSIS — N17.9: ICD-10-CM

## 2020-11-01 DIAGNOSIS — E11.00: Primary | ICD-10-CM

## 2020-11-01 DIAGNOSIS — E11.40: ICD-10-CM

## 2020-11-01 DIAGNOSIS — E11.65: ICD-10-CM

## 2020-11-01 DIAGNOSIS — D72.819: ICD-10-CM

## 2020-11-01 LAB
ALBUMIN SERPL BCG-MCNC: 3.7 G/DL (ref 3.5–5)
ALP SERPL-CCNC: 100 U/L (ref 40–110)
ALT SERPL W P-5'-P-CCNC: 17 U/L (ref 8–55)
ANALYZER IN CARDIO: (no result)
ANION GAP SERPL CALC-SCNC: 13 MMOL/L (ref 10–20)
ANION GAP SERPL CALC-SCNC: 15 MMOL/L (ref 10–20)
ANION GAP SERPL CALC-SCNC: 15 MMOL/L (ref 10–20)
AST SERPL-CCNC: 15 U/L (ref 5–34)
BASE EXCESS STD BLDV CALC-SCNC: 1 MEQ/L
BILIRUB SERPL-MCNC: 0.5 MG/DL (ref 0.2–1.2)
BUN SERPL-MCNC: 11 MG/DL (ref 8.9–20.6)
BUN SERPL-MCNC: 12 MG/DL (ref 8.9–20.6)
BUN SERPL-MCNC: 12 MG/DL (ref 8.9–20.6)
CA-I BLDV-MCNC: 1.08 MMOL/L (ref 1.16–1.32)
CALCIUM SERPL-MCNC: 8.5 MG/DL (ref 7.8–10.44)
CALCIUM SERPL-MCNC: 8.5 MG/DL (ref 7.8–10.44)
CALCIUM SERPL-MCNC: 8.6 MG/DL (ref 7.8–10.44)
CHLORIDE BLDV-SCNC: 96 MMOL/L (ref 98–106)
CHLORIDE SERPL-SCNC: 101 MMOL/L (ref 98–107)
CHLORIDE SERPL-SCNC: 103 MMOL/L (ref 98–107)
CHLORIDE SERPL-SCNC: 94 MMOL/L (ref 98–107)
CO2 SERPL-SCNC: 23 MMOL/L (ref 22–29)
CO2 SERPL-SCNC: 25 MMOL/L (ref 22–29)
CO2 SERPL-SCNC: 25 MMOL/L (ref 22–29)
CREAT CL PREDICTED SERPL C-G-VRATE: 0 ML/MIN (ref 70–130)
CREAT CL PREDICTED SERPL C-G-VRATE: 0 ML/MIN (ref 70–130)
CREAT CL PREDICTED SERPL C-G-VRATE: 101 ML/MIN (ref 70–130)
FERRITIN SERPL-MCNC: 119.08 NG/ML (ref 22–322)
GLOBULIN SER CALC-MCNC: 3.6 G/DL (ref 2.4–3.5)
GLUCOSE SERPL-MCNC: 389 MG/DL (ref 70–105)
GLUCOSE SERPL-MCNC: 389 MG/DL (ref 70–105)
GLUCOSE SERPL-MCNC: 742 MG/DL (ref 70–105)
HCO3 BLDV-SCNC: 27 MEQ/L (ref 22–28)
HCT VFR BLDV CALC: 34 % (ref 42–52)
HGB BLD-MCNC: 12.4 G/DL (ref 14–18)
HGB BLDV-MCNC: 11.5 G/DL (ref 13.2–17.3)
LIPASE SERPL-CCNC: 18 U/L (ref 8–78)
MAGNESIUM SERPL-MCNC: 1.9 MG/DL (ref 1.6–2.6)
MCH RBC QN AUTO: 26.6 PG (ref 27–31)
MCV RBC AUTO: 82.7 FL (ref 78–98)
MDIFF COMPLETE?: YES
PLATELET # BLD AUTO: 194 THOU/UL (ref 130–400)
POTASSIUM BLDV-SCNC: 4.7 MMOL/L (ref 3.7–5.3)
POTASSIUM SERPL-SCNC: 4.2 MMOL/L (ref 3.5–5.1)
POTASSIUM SERPL-SCNC: 4.5 MMOL/L (ref 3.5–5.1)
POTASSIUM SERPL-SCNC: 4.8 MMOL/L (ref 3.5–5.1)
RBC # BLD AUTO: 4.65 MILL/UL (ref 4.7–6.1)
SODIUM BLDV-SCNC: 130 MMOL/L (ref 133–146)
SODIUM SERPL-SCNC: 129 MMOL/L (ref 136–145)
SODIUM SERPL-SCNC: 135 MMOL/L (ref 136–145)
SODIUM SERPL-SCNC: 136 MMOL/L (ref 136–145)
SP GR UR STRIP: 1.03 (ref 1–1.04)
TRANSFERRIN SERPL-MCNC: 197 MG/DL (ref 174–364)
TSH SERPL DL<=0.005 MIU/L-ACNC: 1.35 UIU/ML (ref 0.35–4.94)
UIBC SERPL-MCNC: 246 MCG/DL (ref 261–462)
WBC # BLD AUTO: 4.1 THOU/UL (ref 4.8–10.8)

## 2020-11-01 PROCEDURE — U0003 INFECTIOUS AGENT DETECTION BY NUCLEIC ACID (DNA OR RNA); SEVERE ACUTE RESPIRATORY SYNDROME CORONAVIRUS 2 (SARS-COV-2) (CORONAVIRUS DISEASE [COVID-19]), AMPLIFIED PROBE TECHNIQUE, MAKING USE OF HIGH THROUGHPUT TECHNOLOGIES AS DESCRIBED BY CMS-2020-01-R: HCPCS

## 2020-11-01 PROCEDURE — 82010 KETONE BODYS QUAN: CPT

## 2020-11-01 PROCEDURE — 84100 ASSAY OF PHOSPHORUS: CPT

## 2020-11-01 PROCEDURE — 83036 HEMOGLOBIN GLYCOSYLATED A1C: CPT

## 2020-11-01 PROCEDURE — 84484 ASSAY OF TROPONIN QUANT: CPT

## 2020-11-01 PROCEDURE — 80053 COMPREHEN METABOLIC PANEL: CPT

## 2020-11-01 PROCEDURE — 96372 THER/PROPH/DIAG INJ SC/IM: CPT

## 2020-11-01 PROCEDURE — 83930 ASSAY OF BLOOD OSMOLALITY: CPT

## 2020-11-01 PROCEDURE — 82746 ASSAY OF FOLIC ACID SERUM: CPT

## 2020-11-01 PROCEDURE — 80048 BASIC METABOLIC PNL TOTAL CA: CPT

## 2020-11-01 PROCEDURE — 81003 URINALYSIS AUTO W/O SCOPE: CPT

## 2020-11-01 PROCEDURE — 83605 ASSAY OF LACTIC ACID: CPT

## 2020-11-01 PROCEDURE — 82805 BLOOD GASES W/O2 SATURATION: CPT

## 2020-11-01 PROCEDURE — 84681 ASSAY OF C-PEPTIDE: CPT

## 2020-11-01 PROCEDURE — 83690 ASSAY OF LIPASE: CPT

## 2020-11-01 PROCEDURE — G0378 HOSPITAL OBSERVATION PER HR: HCPCS

## 2020-11-01 PROCEDURE — 82728 ASSAY OF FERRITIN: CPT

## 2020-11-01 PROCEDURE — 83550 IRON BINDING TEST: CPT

## 2020-11-01 PROCEDURE — 85025 COMPLETE CBC W/AUTO DIFF WBC: CPT

## 2020-11-01 PROCEDURE — 36416 COLLJ CAPILLARY BLOOD SPEC: CPT

## 2020-11-01 PROCEDURE — 36415 COLL VENOUS BLD VENIPUNCTURE: CPT

## 2020-11-01 PROCEDURE — 84466 ASSAY OF TRANSFERRIN: CPT

## 2020-11-01 PROCEDURE — 83735 ASSAY OF MAGNESIUM: CPT

## 2020-11-01 PROCEDURE — 96374 THER/PROPH/DIAG INJ IV PUSH: CPT

## 2020-11-01 PROCEDURE — 87635 SARS-COV-2 COVID-19 AMP PRB: CPT

## 2020-11-01 PROCEDURE — 93005 ELECTROCARDIOGRAM TRACING: CPT

## 2020-11-01 PROCEDURE — 82607 VITAMIN B-12: CPT

## 2020-11-01 PROCEDURE — 84443 ASSAY THYROID STIM HORMONE: CPT

## 2020-11-01 PROCEDURE — 96375 TX/PRO/DX INJ NEW DRUG ADDON: CPT

## 2020-11-01 PROCEDURE — 83540 ASSAY OF IRON: CPT

## 2020-11-01 RX ADMIN — INSULIN LISPRO PRN UNIT: 100 INJECTION, SOLUTION INTRAVENOUS; SUBCUTANEOUS at 21:24

## 2020-11-01 NOTE — PDOC.FPRHP
- History of Present Illness


Chief Complaint: elevated BG


History of Present Illness: 





41YO AAM with a PMH notable for IDDMII w/ severe LE neuropathy & possible HTN 

who presented to the ED for evaluation for elevated BG at home. Reports he was 

checking his BG as he normally does this afternoon and it was in the 400s. He 

then gave himself a short acting insulin bolus and began to feel nauseous and 

clammy & requested to be brought to the ER for evaluation. Denies any recent 

illness but did report a few episodes of diarrhea today and some abdominal 

cramping but no recent sick contacts, fever/chills, N/V or dysuria or hematuria.

Does endorse polyuria. Reports compliance with his home insulin & recently got 

refills on both. Normally takes his levemir HS so has not taken it yet today. 





Of note, for the last month has had significant numbness in his B/L LEs from his

feet all the way up to his mid calves. Reports he takes gabapentin BID but 

states it has not been helping. Reports an injury to his back about 20 years ago

and associated leg cramping & weakness as well.


ED Course: 





10U insulin, 2L NS & 8mg zofran





- Allergies/Adverse Reactions


                                    Allergies











Allergy/AdvReac Type Severity Reaction Status Date / Time


 


Penicillins Allergy   Verified 06/30/20 00:53














- Home Medications


                                        











 Medication  Instructions  Recorded  Confirmed  Type


 


Insulin Aspart [Novolog] 15 unit SQ TID 06/30/20 11/01/20 History


 


Gabapentin 800 mg PO BID 11/01/20 11/01/20 History


 


Insulin Detemir [Levemir] 60 unit SQ HS 11/01/20 11/01/20 History














- History


PMHx: DM2 w/ neuropathy, HTN (not on meds)


 


PSHx: none





FHx: multiple family members with cancer: colon and breast


 


Social: denies T/A/D use, recently moved to  from [place], no PCP


 








- Review of Systems


General: denies: fever/chills, weight/appetite/sleep changes


Eyes: denies: vision changes


ENT: denies: nasal congestion, rhinorrhea


Respiratory: denies: cough, congestion, shortness of breath


Cardiovascular: denies: chest pain, palpitation


Gastrointestinal: reports: nausea, diarrhea.  denies: vomiting, abdominal pain


Genitourinary: reports: polyuria.  denies: dysuria


Skin: denies: rashes, lesions


Musculoskeletal: denies: pain, tenderness


Neurological: reports: numbness (b/l LE)


Psychological: denies: anxiety





- Vital signs


BP: 141/83, HR 66, RR 18, T 98.3, O2 98% on RA








- Physical Exam


Constitutional: NAD, awake, alert and oriented


HEENT: normocephalic and atraumatic, grossly normal vision, grossly normal 

hearing


Neck: supple, FROM


Heart: RRR, normal S1/S2, no murmurs/rubs/gallops, pulses present, no edema


Lungs: CTAB, no respiratory distress, no wheezing


Abdomen: soft, bowel sounds present


-Abdomen: 





mild TTP periumbilical


Musculoskeletal: normal structure, normal tone, ROM grossly normal


-Neurological: 





decreased sensation bilateral LE up to knees, decreased fine touch and pain 

sensation


Skin: capillary refill <2 seconds, no jaundice


Heme/Lymphatic: no unusual bruising or bleeding


Psychiatric: normal mood and affect, intact recent and remote memory





FMR H&P: Results





- Labs


Result Diagrams: 


                                 11/01/20 12:35





                                 11/01/20 18:48


Lab results: 


                                        











WBC  4.1 thou/uL (4.8-10.8)  L  11/01/20  12:35    


 


Hgb  12.4 g/dL (14.0-18.0)  L  11/01/20  12:35    


 


Hct  38.4 % (42.0-52.0)  L  11/01/20  12:35    


 


MCV  82.7 fL (78.0-98.0)   11/01/20  12:35    


 


Plt Count  194 thou/uL (130-400)   11/01/20  12:35    


 


Band Neuts % (Manual)  1 % (5-11)  L  11/01/20  12:35    


 


VBG pH  7.36  (7.32-7.43)   11/01/20  14:30    


 


VBG pCO2  48.7 mmHg (42.0-51.0)   11/01/20  14:30    


 


VBG pO2  64.2 mmHg (35.0-45.0)  H  11/01/20  14:30    


 


Sodium  129 mmol/L (136-145)  L  11/01/20  12:35    


 


Potassium  4.8 mmol/L (3.5-5.1)   11/01/20  12:35    


 


Chloride  94 mmol/L ()  L  11/01/20  12:35    


 


Carbon Dioxide  25 mmol/L (22-29)   11/01/20  12:35    


 


BUN  11 mg/dL (8.9-20.6)   11/01/20  12:35    


 


Creatinine  1.74 mg/dL (0.7-1.3)  H  11/01/20  12:35    


 


Glucose  742 mg/dL ()  H*  11/01/20  12:35    


 


Lactic Acid  2.1 mmol/L (0.5-2.2)   11/01/20  15:21    


 


Calcium  8.5 mg/dL (7.8-10.44)   11/01/20  12:35    


 


Total Bilirubin  0.5 mg/dL (0.2-1.2)   11/01/20  12:35    


 


AST  15 U/L (5-34)   11/01/20  12:35    


 


ALT  17 U/L (8-55)   11/01/20  12:35    


 


Alkaline Phosphatase  100 U/L ()   11/01/20  12:35    


 


Serum Total Protein  7.3 g/dL (6.0-8.3)   11/01/20  12:35    


 


Albumin  3.7 g/dL (3.5-5.0)   11/01/20  12:35    


 


Lipase  18 U/L (8-78)   11/01/20  12:35    


 


Urine Ketones  Negative mg/dL (Negative)   11/01/20  12:52    


 


Urine Blood  Negative  (Negative)   11/01/20  12:52    


 


Urine Nitrite  Negative  (Negative)   11/01/20  12:52    


 


Ur Leukocyte Esterase  Negative Delio/uL (Negative)   11/01/20  12:52    














- EKG Interpretation


EKG: 





reviewed, NSR





FMR H&P: A/P





- Plan





41yo male with hx of DM2 who presents with hyperglycemia and weakness





#HHS


- on presentation, given 10units insulin and 2L NS, repeat 


-AG 10, B-hydroxyburtyrate 0.60, pH 7.36


-will recheck BMP q2h, if around 300, will start home meds and SSI


-consider insulin drip if BG>400


-NPO until better control


-pending A1c, c-peptide, Sosm


-LR @ 250ml/hr 


-CM consulted: [facility] for help with med management





#Neuropathy


-b/l LE neuropathy, worsening for past month


-home meds: gabapentin


-pending B12, folate


-if B12 low, consider ordering MMA





#CATALINA


-Cr 1.74 on admission. 


-on IVF as above


-pending repeat BMP





#Leukopenia:


-WBC 4.1 but no s/s infection. 


-Will continue to trend.





#Normocytic anemia:


-H/H 12.4/38, MCV 82.7


-pending Fe studies, folate, PBS, B12





#HTN


-hx of HTN, not on meds


-BP elevated on admission, but now normotensive


-Will continue to monitor 





#DMII


-home insulin held pending repeat BMP


-Hypoglycemia protocol ordered





IVFs: LR @ 250mL/hr


VTE PPX: Lovenox


Code: Full


DIet: NPO, once BG better controlled add CC





Dispo: Admit medical obs for BG control, will need to establish with local PCP, 

LOS<48hrs








FMR H&P: [facility]





- Pertinent history





PCP: None- [facility]





HPI:   40YOM with a PMH notable for IDDMII presenting to the ED for evaluation 

for hyperglycemia. Reports he checked his BG this AM at home and it was in the 

400s. Gave himself some short acting insulin & then developed dry mouth, nausea 

& weakness so was brought to the ER for evaluation. Endorses associated 

diarrhea, polyuria, & abdominal cramping. Denies any fever/chills, recent sick 

contacts, hematuria, dysuria, cough, or congestion. Reports compliance with his 

home insulin regimen. On arrival to the ED the patient's BG level was over 700, 

pH was 7.36 & ketones were 0.60 c/w HHS. Was given 10U regular insulin, 2L NS, &

 8mg IV in the ER.











ED course: See HPI.





See Intern note for details of PMH.





- Pertinent findings





Labs/Imaging:





EKG NSR


pH 7.36


Ketones 0.60


 down to 435 ~2 hours later


Lactate 2.1


K 4.8


UA >1k glucose


WBC 4.1


H/H 12.4/38.4








REVIEW OF SYSTEMS: 


Gen: no fever, chills, + generalized weakness


Neuro: no syncope, no weakness


Eyes: no visual changes, no eye pain


ENT: no sore throat, no congestion


Resp: no cough, no SOB


Card: no chest pain, no edema


GI: + N/V/D, no constipation, + abdominal pain, no melena, no hematochezia


: no dysuria, no hematuria, no incontinence


MSK: + myalgias in B/L legs


Heme: not on blood thinners


Skin: no rash, no lesions/sores





Vitals:  BP: 126/73 HR: 65  RR: 17 Tmax: 98.3F Pox: 98% on RA  Wt: 92 kg





PHYSICAL EXAMINATION: 


General: NAD, alert and oriented x4


HEENT: MMM; no pharyngeal erythema or exudates; grossly normal vision & grossly 

normal hearing


Neck: Supple. Full ROM.


Heart/Cardiovascular System: RRR, no murmur, no edema, pedal pulses strong & 

equal


Lungs/Respiratory System: CTAB


Abdomen/Gastro-Intestinal System: soft w/ no significant abdominal tenderness, 

normal bowel sounds


Extremities: Warm extremities. No cyanosis or edema noted. Compression stockings

 in place.


Neuro: CN 2-12 grossly intact. Significant decreased sensation in B/L LEs from 

feet to knees w/ normal ROM & strength


Psychiatry: Awake, Alert and cooperative with exam.


Skin: No lesions, rashes, or ulcers noted.


Musculoskeletal: Full ROM throughout





- Plan


Date/Time: 11/01/20 1601








I, Marianela Cummings, have evaluated this patient and agree with findings/plan as 

outlined by intern resident. Pertinent changes/additions are listed here.





A/P:





#HHS: Patient presented with a BG level of 742 but no anion gap or markedly 

elevated ketones. Serum pH was also just over 7.3 at 7.36. Was given 10U 

insulin, 2L NS, and 8mg Zofran. Last BG check around 1530 was down to 435. Will 

recheck a BMP now & if around 300 ok to continue with BRUNA SQ insulin but if 

still around 400 or higher will need to start an insulin drip.


 


#Mild dehydration


-Due to #1. s/p 2L in ED & will continue IVFs while NPO and hyperglycemia 

remains >300 w/ LR @ 250mL/hr





#CATALINA


-Due to #1. Cr 1.74 on admission. Will continue to trend with QD BMPs & continue

 IVFs as noted above.





#Leukopenia:


-WBC 4.1 but no s/s infection. Will continue to trend.





#Normocytic anemia:


-H/H 12.4/38 on admission. Will obtain anemia studies including iron studies, 

peripheral smear, folate & B12 levels.





#HTN


-BP elevated on presentation in 150 systolic range. Will continue to monitor & 

likely start on meds tomorrow if it remains elevated overnight. PRNs for now. 





#DMII


-Will hold home insulin dosing in setting of HHS & will adjust PRN based on BG 

readings while inpatient. A1c pending.


-Hypoglycemia protocol ordered.





ABx: None


IVFs: LR @ 250mL/hr


VTE PPX: Lovenox


GI PPX: None


Code status: FULL CODE


Dispo: Admit to medical floor pending latest BMP for BG level s/p IV insulin 

dose for continued IVFs & BRUNA insulin for HHS. 











Addendum - Attending





- Attending Attestation


Date/Time: 11/01/20 2043





I personally evaluated the patient and discussed the management with Dr. Webb/Emiliano


I agree with the History, Examination, Assessment and Plan documented above with

 any addition or exceptions noted below.





40-year-old American male with a history of poorly controlled type 2 diabetes 

mellitus on insulin. Presents with symptomatic hyperglycemia. states he has been

 compliant with his insulin regimen but states his glucoses are labile. Also 

reports history of diabetic neuropathy in his feet and legs is worsened over the

 past month. States the gabapentin he is currently on is not helping him. States

 he has Medicaid insurance. Exam is largely unremarkable except for significant 

paresthesias below the knees bilaterally. Strength and reflexes are appropriate 

in his lower extremities. observation for severe hyperglycemia without DKA or 

HHS. we will aggressively titrate his insulin. continue every 2 hours BMPs and 

is untill his glucose is under 300 at which point we will go to every 6 hour 

Accu-Cheks. given that he has had little relief of his neuropathic symptoms on 

gabapentin, will start duloxetine and he will need to be titrated off gabapentin

 outpatient. I explained to him that his symptoms are likely due to his poorly 

controlled diabetes and that the current neurologic damage may be permanent. he 

expressed understanding. patient has mild anemia which is being worked up with 

iron studies, B12, and folate. I do not suspect that he has a component of 

vitamin B12 deficiency with his neuropathy and that is likely due to his poorly 

controlled type 2 diabetes. observation, medical, less than 2 midnights.

## 2020-11-02 LAB
ANION GAP SERPL CALC-SCNC: 12 MMOL/L (ref 10–20)
BUN SERPL-MCNC: 11 MG/DL (ref 8.9–20.6)
CALCIUM SERPL-MCNC: 8.6 MG/DL (ref 7.8–10.44)
CHLORIDE SERPL-SCNC: 102 MMOL/L (ref 98–107)
CO2 SERPL-SCNC: 26 MMOL/L (ref 22–29)
CREAT CL PREDICTED SERPL C-G-VRATE: 143 ML/MIN (ref 70–130)
GLUCOSE SERPL-MCNC: 302 MG/DL (ref 70–105)
HGB BLD-MCNC: 12.3 G/DL (ref 14–18)
MCH RBC QN AUTO: 26 PG (ref 27–31)
MCV RBC AUTO: 79.5 FL (ref 78–98)
MDIFF COMPLETE?: YES
PLATELET # BLD AUTO: 204 THOU/UL (ref 130–400)
POTASSIUM SERPL-SCNC: 3.9 MMOL/L (ref 3.5–5.1)
RBC # BLD AUTO: 4.73 MILL/UL (ref 4.7–6.1)
SODIUM SERPL-SCNC: 136 MMOL/L (ref 136–145)
WBC # BLD AUTO: 4.1 THOU/UL (ref 4.8–10.8)

## 2020-11-02 RX ADMIN — INSULIN LISPRO PRN UNIT: 100 INJECTION, SOLUTION INTRAVENOUS; SUBCUTANEOUS at 08:39

## 2020-11-02 RX ADMIN — INSULIN LISPRO PRN UNIT: 100 INJECTION, SOLUTION INTRAVENOUS; SUBCUTANEOUS at 19:55

## 2020-11-02 RX ADMIN — INSULIN LISPRO PRN UNIT: 100 INJECTION, SOLUTION INTRAVENOUS; SUBCUTANEOUS at 11:55

## 2020-11-02 RX ADMIN — INSULIN LISPRO PRN UNIT: 100 INJECTION, SOLUTION INTRAVENOUS; SUBCUTANEOUS at 05:10

## 2020-11-02 NOTE — PDOC.FM
- Subjective


Subjective: 





Patient doing well this AM, no acute concerns. Still reports BLT Lower extremity

numbness.





- Objective


Vital Signs & Weight: 


                             Vital Signs (12 hours)











  Temp Pulse Resp BP Pulse Ox


 


 11/02/20 04:00  97.6 F  67  18  138/84  93 L


 


 11/02/20 00:00  98.6 F  70  18  129/78  94 L


 


 11/01/20 20:00      95


 


 11/01/20 19:41  98.7 F  67  18  137/66  95








                                     Weight











Weight                         98.118 kg














I&O: 


                                        











 10/31/20 11/01/20 11/02/20





 07:59 06:59 06:59


 


Intake Total   480


 


Balance   480











Result Diagrams: 


                                 11/02/20 06:23





                                 11/02/20 05:24





Phys Exam





- Physical Examination


Constitutional: NAD


Respiratory: no wheezing, no rales, no rhonchi, clear to auscultation bilateral


Cardiovascular: RRR, no significant murmur, no rub


Gastrointestinal: soft, non-tender, no distention


Musculoskeletal: no edema, pulses present


Neurological: moves all 4 limbs


Decreased sensation in BLT LE below mid calf





Dx/Plan





- Plan


Plan: 





39yo male with hx of DM2 who presents with hyperglycemia and weakness





HHS


- on presentation, AG 10, B-hydroxyburtyrate 0.60, pH 7.36


-Insulin regimen today-50u Detemir QHS, 10u Novolg TID-if BG stable


-consider insulin drip if BG>400


-NPO until better control


-LR @ 250ml/hr 


-CM consulted: home health for help with med management


-C Peptide pending





Neuropathy


-b/l LE neuropathy, worsening for past month


-home meds: gabapentin





CATALINA


-Cr 1.74 on admission, improving with IVF





Leukopenia:


-WBC 4.1 but no s/s infection. 


-Will continue to trend.





Normocytic anemia:


-H/H 12.4/38, MCV 82.7


-TIBC, Fe low, B12/folate wnl





HTN


-hx of HTN, not on meds


-BP elevated on admission, but now normotensive


-Will continue to monitor 





DMII


-home insulin held pending repeat BMP


-Hypoglycemia protocol ordered





IVFs: LR @ 250mL/hr


VTE PPX: Lovenox


Code: Full


DIet: NPO, once BG better controlled add CC





Dispo: Admit medical obs for BG control, will need to establish with local PCP, 

LOS<48hrs





Addendum - Attending





- Attending Attestation


Date/Time: 11/02/20 1921





I personally evaluated the patient and discussed the management with Dr. Thapa


I agree with the History, Examination, Assessment and Plan documented above with

any addition or exceptions noted below - Patient without complaints. Feeling 

better. Afebrile VSS A/P: 1) HHS- resolved. 2) DM- restarted on long acting 

insulin. Pre-genevieve insulin added. Continue to monitor and adjust.

## 2020-11-03 VITALS — DIASTOLIC BLOOD PRESSURE: 86 MMHG | SYSTOLIC BLOOD PRESSURE: 139 MMHG

## 2020-11-03 VITALS — TEMPERATURE: 98.3 F

## 2020-11-03 LAB
ANION GAP SERPL CALC-SCNC: 13 MMOL/L (ref 10–20)
BUN SERPL-MCNC: 9 MG/DL (ref 8.9–20.6)
CALCIUM SERPL-MCNC: 8.7 MG/DL (ref 7.8–10.44)
CHLORIDE SERPL-SCNC: 102 MMOL/L (ref 98–107)
CO2 SERPL-SCNC: 27 MMOL/L (ref 22–29)
CREAT CL PREDICTED SERPL C-G-VRATE: 156 ML/MIN (ref 70–130)
GLUCOSE SERPL-MCNC: 282 MG/DL (ref 70–105)
POTASSIUM SERPL-SCNC: 4 MMOL/L (ref 3.5–5.1)
SODIUM SERPL-SCNC: 138 MMOL/L (ref 136–145)

## 2020-11-03 RX ADMIN — INSULIN LISPRO PRN UNIT: 100 INJECTION, SOLUTION INTRAVENOUS; SUBCUTANEOUS at 05:37

## 2020-11-03 RX ADMIN — INSULIN LISPRO SCH UNIT: 100 INJECTION, SOLUTION INTRAVENOUS; SUBCUTANEOUS at 12:10

## 2020-11-03 RX ADMIN — INSULIN LISPRO SCH UNIT: 100 INJECTION, SOLUTION INTRAVENOUS; SUBCUTANEOUS at 08:13

## 2020-11-03 NOTE — PDOC.FM
- Subjective


Subjective: 





Patient doing well this AM, neuropathy improving, discussed restarting 

metformin, patient agreeable, hesitant 2/2 hearing it can harm your kidneys. 

Discussed that benefits of metformin outweigh risk of kidney injury, however, 

can be monitored outpatient.





- Objective


Vital Signs & Weight: 


                             Vital Signs (12 hours)











  Temp Pulse Resp BP Pulse Ox


 


 11/02/20 19:00  98.7 F  61  20  141/86 H  99








                                     Weight











Weight                         98.118 kg














I&O: 


                                        











 11/01/20 11/02/20 11/03/20





 06:59 06:59 06:59


 


Intake Total  480 4170


 


Balance  480 4170











Result Diagrams: 


                                 11/02/20 06:23





                                 11/03/20 06:33





Phys Exam





- Physical Examination


Constitutional: NAD


Respiratory: no wheezing, no rales, no rhonchi, clear to auscultation bilateral


Cardiovascular: RRR, no significant murmur, no rub


Gastrointestinal: soft, non-tender, no distention, positive bowel sounds


Musculoskeletal: no edema, pulses present





Dx/Plan





- Plan


Plan: 





39yo male with hx of DM2 who presents with hyperglycemia and weakness





[facility]


- on presentation, AG 10, B-hydroxyburtyrate 0.60, pH 7.36


-Insulin regimen today-60u Detemir QHS, 10u Novolg TID-if BG stable


-consider insulin drip if BG>400


-CM consulted: home health for help with med management


- Start metformin 500 mg BID





Neuropathy


-b/l LE neuropathy, worsening for past month


-home meds: gabapentin





CATALINA


-Cr 1.74 on admission, improved with IVF





Leukopenia:


-WBC 4.1 stable with no s/s infection. 





Normocytic anemia:


-H/H 12.4/38, MCV 82.7


-TIBC, Fe low, B12/folate wnl





HTN


-hx of HTN, not on meds


-BP elevated on admission, but now normotensive


-Will continue to monitor 





DMII


-home insulin held pending repeat BMP


-Hypoglycemia protocol ordered





IVFs: SL


VTE PPX: Lovenox


Code: Full


DIet: CC





Dispo: Admit medical obs for BG control, will need to establish with local PCP, 

LOS<48hrs





Addendum - Attending





- Attending Attestation


Date/Time: 11/03/20 1820





I personally evaluated the patient and discussed the management with Dr. Thapa


I agree with the History, Examination, Assessment and Plan documented above with

any addition or exceptions noted below - Patient without complaints. Feeling 

much better. Afebrile VSS. A/P: 1) HHS- resolved 2) Type 2 DM- BG improved; plan

to d/c home today. Has appointment with [employer] tomorrow. Stressed 

importance of follow-up.

## 2020-11-05 NOTE — DIS
DATE OF ADMISSION:  11/01/2020



DATE OF DISCHARGE:  11/03/2020



RESIDENT:  Milind Thapa MD



ADMITTING ATTENDING:  Naveen Navarrete MD



DISCHARGE ATTENDING:  Vikki Cobb MD



PRIMARY DIAGNOSIS:  Hyperosmolar hyperglycemic state.



SECONDARY DIAGNOSES:  Diabetes type 2, hypertension.



DISCHARGE MEDICATIONS:  

1. Metformin 500 mg b.i.d.

2. Cymbalta 60 mg daily.

3. Gabapentin 800 mg b.i.d.

4. Insulin detemir 60 units subcu at bedtime.

5. Insulin Aspart 15 units subcu t.i.d. with meals.



DISCONTINUED MEDICATIONS:  None.



HISTORY OF PRESENT ILLNESS/HOSPITAL COURSE:  The patient is a 40-year-old male 
who

presented to the ED for chief complaint of weakness.  The patient reports that 
he

checks his blood sugar at home and it was in the 400s.  He gave himself short-
acting

insulin and began to feel weak.  On arrival to the ER, the patient's blood 
glucose

was 742.  The patient was given fluids and insulin.  The patient did not have an

anion gap, therefore, DKA protocol was not started.  The patient reported that 
he

had not been taking his insulin as prescribed.  He typically took the 60 units

long-acting at night but did not always take the 15 units short-acting during 
the

day.  A1c was over 14.  The patient's blood sugars while inpatient were 
consistently

above 200.  Discharge was complicated by insurance being [place] Medicaid.  
The

patient reports that he has been refilling medications with no problems.  When 
we

discussed insurance with the patient, patient reported that he had insurance,

however, before discharge, it was brought to our attention that insurance likely

would not cover medications.  We discussed switching patient to 70/30 insulin.

However, the patient wanted to leave.  The patient reported that he had one dose
of

long-acting insulin at home.  Discussed with patient and it was decided to give

lunch time short-acting insulin, then discharge the patient and have patient 
take

dose of long-acting insulin at night.  The patient has followup appointment at 
9:45

on 11/04 with [employer].  [facility]

help him with medication costs. 



DISPOSITION:  Stable.



DISCHARGE INSTRUCTIONS:  

1. Location: Home.

2. Diet: Consistent carb.

3. Activity: As tolerated.

4. Followup: Follow up with [facility] on 11/04 at scheduled 
appointment.







Job ID:  [id number]



[facility] System[facility]PHINE

## 2021-01-17 ENCOUNTER — HOSPITAL ENCOUNTER (EMERGENCY)
Dept: HOSPITAL 92 - ERS | Age: 41
Discharge: HOME | End: 2021-01-17
Payer: SELF-PAY

## 2021-01-17 DIAGNOSIS — E11.65: Primary | ICD-10-CM

## 2021-01-17 DIAGNOSIS — R11.2: ICD-10-CM

## 2021-01-17 DIAGNOSIS — Z79.4: ICD-10-CM

## 2021-01-17 DIAGNOSIS — I10: ICD-10-CM

## 2021-01-17 LAB
ALBUMIN SERPL BCG-MCNC: 3.6 G/DL (ref 3.5–5)
ALP SERPL-CCNC: 102 U/L (ref 40–110)
ALT SERPL W P-5'-P-CCNC: 14 U/L (ref 8–55)
ANION GAP SERPL CALC-SCNC: 17 MMOL/L (ref 10–20)
AST SERPL-CCNC: 13 U/L (ref 5–34)
BASOPHILS # BLD AUTO: 0.1 THOU/UL (ref 0–0.2)
BASOPHILS NFR BLD AUTO: 1.6 % (ref 0–1)
BILIRUB SERPL-MCNC: 0.6 MG/DL (ref 0.2–1.2)
BUN SERPL-MCNC: 24 MG/DL (ref 8.9–20.6)
CALCIUM SERPL-MCNC: 8.3 MG/DL (ref 7.8–10.44)
CHLORIDE SERPL-SCNC: 97 MMOL/L (ref 98–107)
CO2 SERPL-SCNC: 24 MMOL/L (ref 22–29)
CREAT CL PREDICTED SERPL C-G-VRATE: 0 ML/MIN (ref 70–130)
EOSINOPHIL # BLD AUTO: 0 THOU/UL (ref 0–0.7)
EOSINOPHIL NFR BLD AUTO: 0.5 % (ref 0–10)
GLOBULIN SER CALC-MCNC: 4.4 G/DL (ref 2.4–3.5)
GLUCOSE SERPL-MCNC: 540 MG/DL (ref 70–105)
HGB BLD-MCNC: 10.4 G/DL (ref 14–18)
LIPASE SERPL-CCNC: 15 U/L (ref 8–78)
LYMPHOCYTES # BLD: 1.9 THOU/UL (ref 1.2–3.4)
LYMPHOCYTES NFR BLD AUTO: 41.4 % (ref 21–51)
MAGNESIUM SERPL-MCNC: 2.2 MG/DL (ref 1.6–2.6)
MCH RBC QN AUTO: 26.6 PG (ref 27–31)
MCV RBC AUTO: 80 FL (ref 78–98)
MONOCYTES # BLD AUTO: 0.4 THOU/UL (ref 0.11–0.59)
MONOCYTES NFR BLD AUTO: 8.1 % (ref 0–10)
NEUTROPHILS # BLD AUTO: 2.2 THOU/UL (ref 1.4–6.5)
NEUTROPHILS NFR BLD AUTO: 48.5 % (ref 42–75)
PLATELET # BLD AUTO: 202 THOU/UL (ref 130–400)
POTASSIUM SERPL-SCNC: 4.6 MMOL/L (ref 3.5–5.1)
PROT UR STRIP.AUTO-MCNC: 20 MG/DL
RBC # BLD AUTO: 3.9 MILL/UL (ref 4.7–6.1)
SODIUM SERPL-SCNC: 133 MMOL/L (ref 136–145)
SP GR UR STRIP: 1.02 (ref 1–1.04)
WBC # BLD AUTO: 4.6 THOU/UL (ref 4.8–10.8)
WBC UR QL AUTO: (no result) HPF (ref 0–3)

## 2021-01-17 PROCEDURE — 83690 ASSAY OF LIPASE: CPT

## 2021-01-17 PROCEDURE — 84484 ASSAY OF TROPONIN QUANT: CPT

## 2021-01-17 PROCEDURE — 80053 COMPREHEN METABOLIC PANEL: CPT

## 2021-01-17 PROCEDURE — 96374 THER/PROPH/DIAG INJ IV PUSH: CPT

## 2021-01-17 PROCEDURE — 36415 COLL VENOUS BLD VENIPUNCTURE: CPT

## 2021-01-17 PROCEDURE — 71045 X-RAY EXAM CHEST 1 VIEW: CPT

## 2021-01-17 PROCEDURE — 85025 COMPLETE CBC W/AUTO DIFF WBC: CPT

## 2021-01-17 PROCEDURE — 81015 MICROSCOPIC EXAM OF URINE: CPT

## 2021-01-17 PROCEDURE — 81003 URINALYSIS AUTO W/O SCOPE: CPT

## 2021-01-17 PROCEDURE — 36416 COLLJ CAPILLARY BLOOD SPEC: CPT

## 2021-01-17 PROCEDURE — 93005 ELECTROCARDIOGRAM TRACING: CPT

## 2021-01-17 PROCEDURE — 82010 KETONE BODYS QUAN: CPT

## 2021-01-17 PROCEDURE — 83735 ASSAY OF MAGNESIUM: CPT

## 2021-01-17 NOTE — RAD
EXAM: 

CHEST ONE VIEW



HISTORY:

Cough. Hyperglycemia. Nausea.



COMPARISON:

7/19/2020



FINDINGS:

Cardiac silhouette is magnified by projection but does appear mildly enlarged. The pulmonary vasculat
ure is within normal limits. The lungs are clear. No other interval change from prior exam.



IMPRESSION:



1. No acute cardiopulmonary process.

2. Cardiac silhouette is magnified by projection but does appear mildly enlarged when compared to abad
or exam.



Reported By: Lan Saleh 

Electronically Signed:  1/17/2021 1:39 PM

## 2021-01-19 ENCOUNTER — HOSPITAL ENCOUNTER (EMERGENCY)
Dept: HOSPITAL 92 - ERS | Age: 41
Discharge: HOME | End: 2021-01-19
Payer: SELF-PAY

## 2021-01-19 DIAGNOSIS — Z79.4: ICD-10-CM

## 2021-01-19 DIAGNOSIS — E11.65: Primary | ICD-10-CM

## 2021-01-19 LAB
ALBUMIN SERPL BCG-MCNC: 3.6 G/DL (ref 3.5–5)
ALP SERPL-CCNC: 103 U/L (ref 40–110)
ALT SERPL W P-5'-P-CCNC: 15 U/L (ref 8–55)
ANION GAP SERPL CALC-SCNC: 16 MMOL/L (ref 10–20)
AST SERPL-CCNC: 17 U/L (ref 5–34)
BASOPHILS # BLD AUTO: 0.1 THOU/UL (ref 0–0.2)
BASOPHILS NFR BLD AUTO: 1.3 % (ref 0–1)
BILIRUB SERPL-MCNC: 0.7 MG/DL (ref 0.2–1.2)
BUN SERPL-MCNC: 21 MG/DL (ref 8.9–20.6)
CALCIUM SERPL-MCNC: 8.1 MG/DL (ref 7.8–10.44)
CHLORIDE SERPL-SCNC: 94 MMOL/L (ref 98–107)
CO2 SERPL-SCNC: 25 MMOL/L (ref 22–29)
CREAT CL PREDICTED SERPL C-G-VRATE: 0 ML/MIN (ref 70–130)
EOSINOPHIL # BLD AUTO: 0 THOU/UL (ref 0–0.7)
EOSINOPHIL NFR BLD AUTO: 0.6 % (ref 0–10)
GLOBULIN SER CALC-MCNC: 4.7 G/DL (ref 2.4–3.5)
GLUCOSE SERPL-MCNC: 674 MG/DL (ref 70–105)
HGB BLD-MCNC: 10.4 G/DL (ref 14–18)
LYMPHOCYTES # BLD: 2.1 THOU/UL (ref 1.2–3.4)
LYMPHOCYTES NFR BLD AUTO: 47.7 % (ref 21–51)
MCH RBC QN AUTO: 25.5 PG (ref 27–31)
MCV RBC AUTO: 79.5 FL (ref 78–98)
MONOCYTES # BLD AUTO: 0.4 THOU/UL (ref 0.11–0.59)
MONOCYTES NFR BLD AUTO: 9.3 % (ref 0–10)
NEUTROPHILS # BLD AUTO: 1.8 THOU/UL (ref 1.4–6.5)
NEUTROPHILS NFR BLD AUTO: 41 % (ref 42–75)
PLATELET # BLD AUTO: 203 THOU/UL (ref 130–400)
POTASSIUM SERPL-SCNC: 4.7 MMOL/L (ref 3.5–5.1)
RBC # BLD AUTO: 4.08 MILL/UL (ref 4.7–6.1)
SODIUM SERPL-SCNC: 130 MMOL/L (ref 136–145)
WBC # BLD AUTO: 4.3 THOU/UL (ref 4.8–10.8)

## 2021-01-19 PROCEDURE — 93005 ELECTROCARDIOGRAM TRACING: CPT

## 2021-01-19 PROCEDURE — 84484 ASSAY OF TROPONIN QUANT: CPT

## 2021-01-19 PROCEDURE — 85025 COMPLETE CBC W/AUTO DIFF WBC: CPT

## 2021-01-19 PROCEDURE — 36415 COLL VENOUS BLD VENIPUNCTURE: CPT

## 2021-01-19 PROCEDURE — 71045 X-RAY EXAM CHEST 1 VIEW: CPT

## 2021-01-19 PROCEDURE — 36416 COLLJ CAPILLARY BLOOD SPEC: CPT

## 2021-01-19 PROCEDURE — 80053 COMPREHEN METABOLIC PANEL: CPT

## 2021-01-20 ENCOUNTER — HOSPITAL ENCOUNTER (EMERGENCY)
Dept: HOSPITAL 92 - ERS | Age: 41
LOS: 1 days | Discharge: HOME | End: 2021-01-21
Payer: SELF-PAY

## 2021-01-20 DIAGNOSIS — E11.65: Primary | ICD-10-CM

## 2021-01-20 DIAGNOSIS — E86.0: ICD-10-CM

## 2021-01-20 DIAGNOSIS — Z79.4: ICD-10-CM

## 2021-01-20 DIAGNOSIS — I10: ICD-10-CM

## 2021-01-20 LAB
ALBUMIN SERPL BCG-MCNC: 3.1 G/DL (ref 3.5–5)
ALP SERPL-CCNC: 97 U/L (ref 40–110)
ALT SERPL W P-5'-P-CCNC: 12 U/L (ref 8–55)
ANION GAP SERPL CALC-SCNC: 14 MMOL/L (ref 10–20)
AST SERPL-CCNC: 14 U/L (ref 5–34)
BILIRUB SERPL-MCNC: 0.6 MG/DL (ref 0.2–1.2)
BUN SERPL-MCNC: 16 MG/DL (ref 8.9–20.6)
CALCIUM SERPL-MCNC: 7.2 MG/DL (ref 7.8–10.44)
CHLORIDE SERPL-SCNC: 95 MMOL/L (ref 98–107)
CO2 SERPL-SCNC: 22 MMOL/L (ref 22–29)
CREAT CL PREDICTED SERPL C-G-VRATE: 0 ML/MIN (ref 70–130)
GLOBULIN SER CALC-MCNC: 3.9 G/DL (ref 2.4–3.5)
GLUCOSE SERPL-MCNC: 782 MG/DL (ref 70–105)
HGB BLD-MCNC: 9.1 G/DL (ref 14–18)
MCH RBC QN AUTO: 25.8 PG (ref 27–31)
MCV RBC AUTO: 80.6 FL (ref 78–98)
MDIFF COMPLETE?: YES
PLATELET # BLD AUTO: 189 THOU/UL (ref 130–400)
POTASSIUM SERPL-SCNC: 4.4 MMOL/L (ref 3.5–5.1)
RBC # BLD AUTO: 3.51 MILL/UL (ref 4.7–6.1)
SODIUM SERPL-SCNC: 127 MMOL/L (ref 136–145)
SP GR UR STRIP: 1.02 (ref 1–1.04)
WBC # BLD AUTO: 3.8 THOU/UL (ref 4.8–10.8)
WBC UR QL AUTO: (no result) HPF (ref 0–3)

## 2021-01-20 PROCEDURE — 36415 COLL VENOUS BLD VENIPUNCTURE: CPT

## 2021-01-20 PROCEDURE — 80053 COMPREHEN METABOLIC PANEL: CPT

## 2021-01-20 PROCEDURE — 96374 THER/PROPH/DIAG INJ IV PUSH: CPT

## 2021-01-20 PROCEDURE — 36416 COLLJ CAPILLARY BLOOD SPEC: CPT

## 2021-01-20 PROCEDURE — 81003 URINALYSIS AUTO W/O SCOPE: CPT

## 2021-01-20 PROCEDURE — 85025 COMPLETE CBC W/AUTO DIFF WBC: CPT

## 2021-01-20 PROCEDURE — 81015 MICROSCOPIC EXAM OF URINE: CPT

## 2021-01-20 PROCEDURE — 82010 KETONE BODYS QUAN: CPT

## 2021-01-20 NOTE — RAD
PORTABLE CHEST:

 

Date:  01/19/2021

 

HISTORY:  

Chest pain. Elevated blood sugar. 

 

COMPARISON:  

01/17/2021 exam. 

 

FINDINGS:

Heart size appears borderline enlarged for portable technique. Mediastinal structures are unremarkabl
e. Lungs are clear of infiltrates. There are no signs of failure. 

 

IMPRESSION: 

No active intrathoracic disease. Heart size upper limits of normal. 

 

 

POS: OFF

## 2021-02-06 NOTE — EKG
Test Reason : 

Blood Pressure : ***/*** mmHG

Vent. Rate : 083 BPM     Atrial Rate : 083 BPM

   P-R Int : 168 ms          QRS Dur : 086 ms

    QT Int : 360 ms       P-R-T Axes : 048 013 023 degrees

   QTc Int : 423 ms

 

Normal sinus rhythm

Possible Left atrial enlargement

Borderline ECG

 

Confirmed by ISABEL SANCHEZ DO ([id number]),  ALHAJI FOUNTAIN ([id number]) on 2/6/2021 10:16:34 PM

 

Referred By:             Confirmed By:ISABEL SANCHEZ DO

## 2021-02-06 NOTE — EKG
Test Reason : HIGH GLUCOSE

Blood Pressure : ***/*** mmHG

Vent. Rate : 093 BPM     Atrial Rate : 093 BPM

   P-R Int : 170 ms          QRS Dur : 088 ms

    QT Int : 350 ms       P-R-T Axes : 043 007 019 degrees

   QTc Int : 435 ms

 

Normal sinus rhythm

Possible Left atrial enlargement

Borderline ECG

 

Confirmed by ADARSH HICKS DO ([id number]),  ALHAJI FOUNTAIN ([id number]) on 2/6/2021 9:49:58 PM

 

Referred By:             Confirmed By:ADARSH HICKS DO